# Patient Record
Sex: FEMALE | Race: WHITE | NOT HISPANIC OR LATINO | Employment: PART TIME | URBAN - METROPOLITAN AREA
[De-identification: names, ages, dates, MRNs, and addresses within clinical notes are randomized per-mention and may not be internally consistent; named-entity substitution may affect disease eponyms.]

---

## 2018-04-10 ENCOUNTER — TELEPHONE (OUTPATIENT)
Dept: HEMATOLOGY ONCOLOGY | Facility: MEDICAL CENTER | Age: 18
End: 2018-04-10

## 2018-04-11 NOTE — TELEPHONE ENCOUNTER
1st attempt to contact the patient.  LM on voicemail for patient requesting a call back to schedule a new patient hematology appointment.  Ref: Dom Lawrence Dx:  Decreased WBC

## 2018-05-09 ENCOUNTER — HOSPITAL ENCOUNTER (OUTPATIENT)
Facility: MEDICAL CENTER | Age: 18
End: 2018-05-09
Attending: INTERNAL MEDICINE
Payer: COMMERCIAL

## 2018-05-09 ENCOUNTER — OFFICE VISIT (OUTPATIENT)
Dept: HEMATOLOGY ONCOLOGY | Facility: MEDICAL CENTER | Age: 18
End: 2018-05-09
Payer: COMMERCIAL

## 2018-05-09 VITALS
DIASTOLIC BLOOD PRESSURE: 58 MMHG | HEART RATE: 60 BPM | WEIGHT: 144.95 LBS | SYSTOLIC BLOOD PRESSURE: 106 MMHG | RESPIRATION RATE: 14 BRPM | TEMPERATURE: 98.8 F | OXYGEN SATURATION: 98 % | BODY MASS INDEX: 24.75 KG/M2 | HEIGHT: 64 IN

## 2018-05-09 DIAGNOSIS — D70.8 OTHER NEUTROPENIA (HCC): ICD-10-CM

## 2018-05-09 PROBLEM — D70.9 NEUTROPENIA (HCC): Status: ACTIVE | Noted: 2018-05-09

## 2018-05-09 LAB
BASOPHILS # BLD AUTO: 0.5 % (ref 0–1.8)
BASOPHILS # BLD: 0.02 K/UL (ref 0–0.12)
EOSINOPHIL # BLD AUTO: 0.05 K/UL (ref 0–0.51)
EOSINOPHIL NFR BLD: 1.3 % (ref 0–6.9)
ERYTHROCYTE [DISTWIDTH] IN BLOOD BY AUTOMATED COUNT: 40.4 FL (ref 35.9–50)
ERYTHROCYTE [SEDIMENTATION RATE] IN BLOOD BY WESTERGREN METHOD: 8 MM/HOUR (ref 0–20)
FERRITIN SERPL-MCNC: 22.6 NG/ML (ref 10–291)
HCT VFR BLD AUTO: 40.9 % (ref 37–47)
HGB BLD-MCNC: 13.6 G/DL (ref 12–16)
IMM GRANULOCYTES # BLD AUTO: 0 K/UL (ref 0–0.11)
IMM GRANULOCYTES NFR BLD AUTO: 0 % (ref 0–0.9)
LYMPHOCYTES # BLD AUTO: 0.77 K/UL (ref 1–4.8)
LYMPHOCYTES NFR BLD: 20.2 % (ref 22–41)
MCH RBC QN AUTO: 28.8 PG (ref 27–33)
MCHC RBC AUTO-ENTMCNC: 33.3 G/DL (ref 33.6–35)
MCV RBC AUTO: 86.5 FL (ref 81.4–97.8)
MONOCYTES # BLD AUTO: 0.47 K/UL (ref 0–0.85)
MONOCYTES NFR BLD AUTO: 12.3 % (ref 0–13.4)
NEUTROPHILS # BLD AUTO: 2.5 K/UL (ref 2–7.15)
NEUTROPHILS NFR BLD: 65.7 % (ref 44–72)
NRBC # BLD AUTO: 0 K/UL
NRBC BLD-RTO: 0 /100 WBC
PLATELET # BLD AUTO: 213 K/UL (ref 164–446)
PMV BLD AUTO: 11 FL (ref 9–12.9)
RBC # BLD AUTO: 4.73 M/UL (ref 4.2–5.4)
TSH SERPL DL<=0.005 MIU/L-ACNC: 1.4 UIU/ML (ref 0.38–5.33)
VIT B12 SERPL-MCNC: 549 PG/ML (ref 211–911)
WBC # BLD AUTO: 3.8 K/UL (ref 4.8–10.8)

## 2018-05-09 PROCEDURE — 86225 DNA ANTIBODY NATIVE: CPT

## 2018-05-09 PROCEDURE — 85025 COMPLETE CBC W/AUTO DIFF WBC: CPT

## 2018-05-09 PROCEDURE — 86235 NUCLEAR ANTIGEN ANTIBODY: CPT | Mod: 91

## 2018-05-09 PROCEDURE — 82607 VITAMIN B-12: CPT

## 2018-05-09 PROCEDURE — 82728 ASSAY OF FERRITIN: CPT

## 2018-05-09 PROCEDURE — 85652 RBC SED RATE AUTOMATED: CPT

## 2018-05-09 PROCEDURE — 36415 COLL VENOUS BLD VENIPUNCTURE: CPT

## 2018-05-09 PROCEDURE — 99204 OFFICE O/P NEW MOD 45 MIN: CPT | Performed by: INTERNAL MEDICINE

## 2018-05-09 PROCEDURE — 84443 ASSAY THYROID STIM HORMONE: CPT

## 2018-05-09 PROCEDURE — 86038 ANTINUCLEAR ANTIBODIES: CPT

## 2018-05-09 ASSESSMENT — PAIN SCALES - GENERAL: PAINLEVEL: NO PAIN

## 2018-05-09 NOTE — PROGRESS NOTES
Consult Note: Hematology    Date of consultation: 5/9/2018 3:28 PM    Referring provider: Dom Lawrence D.O.    Reason for consultation: Mild leukopenia      History of presenting illness:     Dear  Dom Lawrence D.O.,    Thank you very much for allowing me to see  Rebecca Mendieta today . As you know she  is a 17 y.o. year old female who had routine labs done during evaluation of palpitations. This showed mild leukopenia with WBC of 3.2. Hemoglobin was normal at 13.2, platelet count 200. Differential was grossly unremarkable. ANC was around 1500. She denies having any acute complaints at this time. She is undergoing evaluation of palpitation. No history of lupus or other autoimmune problems. No lymphadenopathy. B symptoms or recurrent infections    Past Medical History:    Past Medical History:   Diagnosis Date   • Neutropenia (HCC) 5/9/2018       Past surgical history:  No past surgical history on file.    Allergies:  Patient has no known allergies.    Medications:    No current outpatient prescriptions on file.     No current facility-administered medications for this visit.        Social History:     Social History     Social History   • Marital status: Single     Spouse name: N/A   • Number of children: N/A   • Years of education: N/A     Occupational History   • Not on file.     Social History Main Topics   • Smoking status: Not on file   • Smokeless tobacco: Not on file   • Alcohol use Not on file   • Drug use: Unknown   • Sexual activity: Not on file     Other Topics Concern   • Not on file     Social History Narrative   • No narrative on file       Family History:   No family history on file.    Review of Systems:  All other review of systems are negative except what was mentioned above in the HPI.    Constitutional: No fever, chills, weight loss ,malaise/fatigue.    HEENT: No new auditory or visual complaints. No sore throat and neck pain.     Respiratory:No new cough, sputum production, shortness of  "breath and wheezing.    Cardiovascular: No new chest pain, positive for subjective palpitations, orthopnea and leg swelling.    Gastrointestinal: No heartburn, nausea, vomiting ,abdominal pain, hematochezia or melena     Genitourinary: Negative for dysuria, hematuria    Musculoskeletal: No new arthralgias or myalgias   Skin: Negative for rash and itching.    Neurological: Negative for focal weakness or headaches.    Endo/Heme/Allergies: No abnormal bleed/bruise.    Psychiatric/Behavioral: No new depression/anxiety.    Physical Exam:  Vitals:   /58   Pulse 60   Temp 37.1 °C (98.8 °F)   Resp 14   Ht 1.626 m (5' 4\")   Wt 65.8 kg (144 lb 15.2 oz)   SpO2 98%   Breastfeeding? No   BMI 24.88 kg/m²     General: Not in acute distress, alert and oriented x 3  HEENT: No pallor, icterus. Oropharynx clear.   Neck: Supple, no palpable masses.  Lymph nodes: No palpable cervical, supraclavicular, axillary or inguinal lymphadenopathy.    CVS: regular rate and rhythm, no rubs or gallops  RESP: Clear to auscultate bilaterally, no wheezing or crackles.   ABD: Soft, non tender, non distended, positive bowel sounds, no palpable organomegaly  EXT: No edema or cyanosis  CNS: Alert and oriented x3, No focal deficits.  Skin- No rash      Labs:   No results for input(s): RBC, HEMOGLOBIN, HEMATOCRIT, PLATELETCT, PROTHROMBTM, APTT, INR, IRON, FERRITIN, TOTIRONBC in the last 72 hours.  No results found for: SODIUM, POTASSIUM, CHLORIDE, CO2, GLUCOSE, BUN, CREATININE, BUNCREATRAT, GLOMRATE     Assessment and Plan:  , Mild leukopenia-repeat CBC from today shows WBC of 3.8. Differential is grossly unremarkable. She does not have any recurrent infections. B symptoms or lymphadenopathy. Reassured her that this could be a normal variant for her.    Rest of the laboratory workup including ESR, vitamin B12, ferritin, TSH was unremarkable. GIANCARLO titer later came back showing low positive titer of 1:80. ds DNA testing was negative. This is of " unclear significance and probably not clinically positive.    At this point she does not need any active intervention. CBC can be checked every 6 months to annually to keep an eye on her blood counts. If her WBC drops more towards 2000, It will like to see her back for more evaluation. If she develops any autoimmune manifestation, she will need further workup including repeat GIANCARLO titers.    She agreed and verbalized her agreement and understanding with the current plan.  I answered all questions and concerns she has at this time.              Thank you for allowing me to participate in her care.    Please note that this dictation was created using voice recognition software. I have made every reasonable attempt to correct obvious errors, but I expect that there are errors of grammar and possibly content that I did not discover before finalizing the note.      SIGNATURES:  Jaya Sapp    CC:  Pcp Pt States None  Dom Lawrence D.O.

## 2018-05-09 NOTE — LETTER
[facility]   [address] [place] [address], [address]  [place] [address]  Phone: [phone]  Fax: [phone]              Rebecca Mendieta  2000    Encounter Date: 5/9/2018    Jaya Sapp M.D.          PROGRESS NOTE:  Consult Note: Hematology    Date of consultation: 5/9/2018 3:28 PM    Referring provider: Dom Lawrence D.O.    Reason for consultation: Mild leukopenia      History of presenting illness:     Dear  Dom Lawrence D.O.,    Thank you very much for allowing me to see  Rebecca Mendieta today . As you know she  is a 17 y.o. year old female who had routine labs done during evaluation of palpitations. This showed mild leukopenia with WBC of 3.2. Hemoglobin was normal at 13.2, platelet count 200. Differential was grossly unremarkable. ANC was around 1500. She denies having any acute complaints at this time. She is undergoing evaluation of palpitation. No history of lupus or other autoimmune problems. No lymphadenopathy. B symptoms or recurrent infections    Past Medical History:    Past Medical History:   Diagnosis Date   • Neutropenia (HCC) 5/9/2018       Past surgical history:  No past surgical history on file.    Allergies:  Patient has no known allergies.    Medications:    No current outpatient prescriptions on file.     No current facility-administered medications for this visit.        Social History:     Social History     Social History   • Marital status: Single     Spouse name: N/A   • Number of children: N/A   • Years of education: N/A     Occupational History   • Not on file.     Social History Main Topics   • Smoking status: Not on file   • Smokeless tobacco: Not on file   • Alcohol use Not on file   • Drug use: Unknown   • Sexual activity: Not on file     Other Topics Concern   • Not on file     Social History Narrative   • No narrative on file       Family History:   No family history on file.    Review of Systems:  All other review of systems are negative except what was  "mentioned above in the HPI.    Constitutional: No fever, chills, weight loss ,malaise/fatigue.    HEENT: No new auditory or visual complaints. No sore throat and neck pain.     Respiratory:No new cough, sputum production, shortness of breath and wheezing.    Cardiovascular: No new chest pain, palpitations, orthopnea and leg swelling.    Gastrointestinal: No heartburn, nausea, vomiting ,abdominal pain, hematochezia or melena     Genitourinary: Negative for dysuria, hematuria    Musculoskeletal: No new arthralgias or myalgias   Skin: Negative for rash and itching.    Neurological: Negative for focal weakness or headaches.    Endo/Heme/Allergies: No abnormal bleed/bruise.    Psychiatric/Behavioral: No new depression/anxiety.    Physical Exam:  Vitals:   /58   Pulse 60   Temp 37.1 °C (98.8 °F)   Resp 14   Ht 1.626 m (5' 4\")   Wt 65.8 kg (144 lb 15.2 oz)   SpO2 98%   Breastfeeding? No   BMI 24.88 kg/m²      General: Not in acute distress, alert and oriented x 3  HEENT: No pallor, icterus. Oropharynx clear.   Neck: Supple, no palpable masses.  Lymph nodes: No palpable cervical, supraclavicular, axillary or inguinal lymphadenopathy.    CVS: regular rate and rhythm, no rubs or gallops  RESP: Clear to auscultate bilaterally, no wheezing or crackles.   ABD: Soft, non tender, non distended, positive bowel sounds, no palpable organomegaly  EXT: No edema or cyanosis  CNS: Alert and oriented x3, No focal deficits.  Skin- No rash      Labs:   No results for input(s): RBC, HEMOGLOBIN, HEMATOCRIT, PLATELETCT, PROTHROMBTM, APTT, INR, IRON, FERRITIN, TOTIRONBC in the last 72 hours.  No results found for: SODIUM, POTASSIUM, CHLORIDE, CO2, GLUCOSE, BUN, CREATININE, BUNCREATRAT, GLOMRATE     Assessment and Plan:  , Mild leukopenia-repeat CBC from today shows WBC of 3.8. Differential is grossly unremarkable. She does not have any recurrent infections. B symptoms or lymphadenopathy. Reassured her that this could be a normal " gradient for her.    Rest of the laboratory workup including ESR, vitamin B12, ferritin, TSH was unremarkable. GIANCARLO titer later came back showing low positive titer of 1:80. DS DNA testing was negative. This is of unclear significance and probably not clinically positive.    At this point she does not need any active intervention. CBC can be checked every 6 months to annually to keep an eye on her blood counts. If her WBC drops more towards 2000. It will like to see her back for more evaluation. If she develops any autoimmune manifestation, she will need further workup including repeat GIANCARLO titers.    She agreed and verbalized her agreement and understanding with the current plan.  I answered all questions and concerns she has at this time.              Thank you for allowing me to participate in her care.    Please note that this dictation was created using voice recognition software. I have made every reasonable attempt to correct obvious errors, but I expect that there are errors of grammar and possibly content that I did not discover before finalizing the note.      SIGNATURES:  Jaya Sapp    CC:  Pcp Pt States None  Dom Lawrence, D.O.        No Recipients

## 2018-05-14 ENCOUNTER — TELEPHONE (OUTPATIENT)
Dept: HEMATOLOGY ONCOLOGY | Facility: MEDICAL CENTER | Age: 18
End: 2018-05-14

## 2018-05-15 NOTE — TELEPHONE ENCOUNTER
Attempted to reach Rebecca to review lab results (GIANCARLO titer) per Dr. Sapp. Pt's GIANCARLO was mildly positive, but Dr. Sapp does not believe this to be clinically significant. No f/u needed.    LVM to call TALYA Whiting at [phone].

## 2018-05-16 LAB
DSDNA AB TITR SER CLIF: ABNORMAL {TITER}
ENA SM IGG SER-ACNC: 0 AU/ML (ref 0–40)
ENA SS-B IGG SER IA-ACNC: 0 AU/ML (ref 0–40)
NUCLEAR IGG SER QL IA: DETECTED
NUCLEAR IGG TITR SER IF: ABNORMAL {TITER}
SSA52 R0ENA AB IGG Q0420: 8 AU/ML (ref 0–40)
SSA60 R0ENA AB IGG Q0419: 11 AU/ML (ref 0–40)
U1 SNRNP IGG SER QL: 0 AU/ML (ref 0–40)

## 2022-04-13 NOTE — LETTER
[facility]   [address] [place] [address], [address]  [place] [address]  Phone: [phone]  Fax: [phone]              Rebecca Mendieta  2000    Encounter Date: 5/9/2018    Jaya Sapp M.D.          PROGRESS NOTE:  Consult Note: Hematology    Date of consultation: 5/9/2018 3:28 PM    Referring provider: Dom Lawrence D.O.    Reason for consultation: Mild leukopenia      History of presenting illness:     Dear  Dom Lawrence D.O.,    Thank you very much for allowing me to see  Rebecca Mendieta today . As you know she  is a 17 y.o. year old female who had routine labs done during evaluation of palpitations. This showed mild leukopenia with WBC of 3.2. Hemoglobin was normal at 13.2, platelet count 200. Differential was grossly unremarkable. ANC was around 1500. She denies having any acute complaints at this time. She is undergoing evaluation of palpitation. No history of lupus or other autoimmune problems. No lymphadenopathy. B symptoms or recurrent infections    Past Medical History:    Past Medical History:   Diagnosis Date   • Neutropenia (HCC) 5/9/2018       Past surgical history:  No past surgical history on file.    Allergies:  Patient has no known allergies.    Medications:    No current outpatient prescriptions on file.     No current facility-administered medications for this visit.        Social History:     Social History     Social History   • Marital status: Single     Spouse name: N/A   • Number of children: N/A   • Years of education: N/A     Occupational History   • Not on file.     Social History Main Topics   • Smoking status: Not on file   • Smokeless tobacco: Not on file   • Alcohol use Not on file   • Drug use: Unknown   • Sexual activity: Not on file     Other Topics Concern   • Not on file     Social History Narrative   • No narrative on file       Family History:   No family history on file.    Review of Systems:  All other review of systems are negative except what was  "mentioned above in the HPI.    Constitutional: No fever, chills, weight loss ,malaise/fatigue.    HEENT: No new auditory or visual complaints. No sore throat and neck pain.     Respiratory:No new cough, sputum production, shortness of breath and wheezing.    Cardiovascular: No new chest pain, positive for subjective palpitations, orthopnea and leg swelling.    Gastrointestinal: No heartburn, nausea, vomiting ,abdominal pain, hematochezia or melena     Genitourinary: Negative for dysuria, hematuria    Musculoskeletal: No new arthralgias or myalgias   Skin: Negative for rash and itching.    Neurological: Negative for focal weakness or headaches.    Endo/Heme/Allergies: No abnormal bleed/bruise.    Psychiatric/Behavioral: No new depression/anxiety.    Physical Exam:  Vitals:   /58   Pulse 60   Temp 37.1 °C (98.8 °F)   Resp 14   Ht 1.626 m (5' 4\")   Wt 65.8 kg (144 lb 15.2 oz)   SpO2 98%   Breastfeeding? No   BMI 24.88 kg/m²      General: Not in acute distress, alert and oriented x 3  HEENT: No pallor, icterus. Oropharynx clear.   Neck: Supple, no palpable masses.  Lymph nodes: No palpable cervical, supraclavicular, axillary or inguinal lymphadenopathy.    CVS: regular rate and rhythm, no rubs or gallops  RESP: Clear to auscultate bilaterally, no wheezing or crackles.   ABD: Soft, non tender, non distended, positive bowel sounds, no palpable organomegaly  EXT: No edema or cyanosis  CNS: Alert and oriented x3, No focal deficits.  Skin- No rash      Labs:   No results for input(s): RBC, HEMOGLOBIN, HEMATOCRIT, PLATELETCT, PROTHROMBTM, APTT, INR, IRON, FERRITIN, TOTIRONBC in the last 72 hours.  No results found for: SODIUM, POTASSIUM, CHLORIDE, CO2, GLUCOSE, BUN, CREATININE, BUNCREATRAT, GLOMRATE     Assessment and Plan:  , Mild leukopenia-repeat CBC from today shows WBC of 3.8. Differential is grossly unremarkable. She does not have any recurrent infections. B symptoms or lymphadenopathy. Reassured her that " this could be a normal variant for her.    Rest of the laboratory workup including ESR, vitamin B12, ferritin, TSH was unremarkable. GIANCARLO titer later came back showing low positive titer of 1:80. ds DNA testing was negative. This is of unclear significance and probably not clinically positive.    At this point she does not need any active intervention. CBC can be checked every 6 months to annually to keep an eye on her blood counts. If her WBC drops more towards 2000, It will like to see her back for more evaluation. If she develops any autoimmune manifestation, she will need further workup including repeat GIANCARLO titers.    She agreed and verbalized her agreement and understanding with the current plan.  I answered all questions and concerns she has at this time.              Thank you for allowing me to participate in her care.    Please note that this dictation was created using voice recognition software. I have made every reasonable attempt to correct obvious errors, but I expect that there are errors of grammar and possibly content that I did not discover before finalizing the note.      SIGNATURES:  Jaya Sapp    CC:  Pcp Pt States None  Dom Lawrence, D.O.        No Recipients               Speaking Coherently

## 2023-02-11 ENCOUNTER — OFFICE VISIT (OUTPATIENT)
Dept: URGENT CARE | Facility: CLINIC | Age: 23
End: 2023-02-11
Payer: COMMERCIAL

## 2023-02-11 VITALS
DIASTOLIC BLOOD PRESSURE: 60 MMHG | OXYGEN SATURATION: 100 % | HEIGHT: 65 IN | RESPIRATION RATE: 14 BRPM | SYSTOLIC BLOOD PRESSURE: 110 MMHG | WEIGHT: 155 LBS | TEMPERATURE: 98.4 F | BODY MASS INDEX: 25.83 KG/M2 | HEART RATE: 92 BPM

## 2023-02-11 DIAGNOSIS — S01.332A INFECTED PIERCED EAR, LEFT, INITIAL ENCOUNTER: ICD-10-CM

## 2023-02-11 DIAGNOSIS — L08.9 INFECTED PIERCED EAR, LEFT, INITIAL ENCOUNTER: ICD-10-CM

## 2023-02-11 PROCEDURE — 99203 OFFICE O/P NEW LOW 30 MIN: CPT | Performed by: FAMILY MEDICINE

## 2023-02-11 RX ORDER — NORGESTIMATE AND ETHINYL ESTRADIOL 7DAYSX3 28
1 KIT ORAL
COMMUNITY
Start: 2022-11-26 | End: 2023-08-22

## 2023-02-11 RX ORDER — CIPROFLOXACIN 500 MG/1
500 TABLET, FILM COATED ORAL 2 TIMES DAILY
Qty: 14 TABLET | Refills: 0 | Status: SHIPPED | OUTPATIENT
Start: 2023-02-11 | End: 2023-02-18

## 2023-02-11 ASSESSMENT — ENCOUNTER SYMPTOMS: FEVER: 0

## 2023-02-11 NOTE — PROGRESS NOTES
"Subjective:     Rebecca Mendieta is a 22 y.o. female who presents for Ear Pain (X3 days new piercing swollen)    HPI  Pt presents for evaluation of an acute problem  Patient with a new piercing in the left ear about 10 days ago  Has had multiple piercings in the past and has had no bad reactions to different types of metals or jewelry  Has been cleaning the piercing as she usually does with other past piercings  Ear has started to become red and swollen the past 3 days  Swelling is focally around the area of the new piercing  Becoming more painful and wanted to be evaluated to see if this is normal or if it is an infection    Review of Systems   Constitutional:  Negative for fever.   Skin:  Positive for rash.     PMH:  has a past medical history of Neutropenia (HCC) (5/9/2018).  MEDS:   Current Outpatient Medications:     TRI-SPRINTEC 0.18/0.215/0.25 MG-35 MCG Tab, Take 1 Tablet by mouth every day., Disp: , Rfl:     ciprofloxacin (CIPRO) 500 MG Tab, Take 1 Tablet by mouth 2 times a day for 7 days., Disp: 14 Tablet, Rfl: 0  ALLERGIES: No Known Allergies  SURGHX: History reviewed. No pertinent surgical history.  SOCHX:  reports that she has never smoked. She has never used smokeless tobacco.     Objective:   /60 (BP Location: Left arm, Patient Position: Sitting, BP Cuff Size: Adult)   Pulse 92   Temp 36.9 °C (98.4 °F) (Temporal)   Resp 14   Ht 1.638 m (5' 4.5\")   Wt 70.3 kg (155 lb)   SpO2 100%   BMI 26.19 kg/m²     Physical Exam  Constitutional:       General: She is not in acute distress.     Appearance: She is well-developed. She is not diaphoretic.   Pulmonary:      Effort: Pulmonary effort is normal.   Skin:     Comments: Left ear with piercing in the upper outer ear with mild surrounding erythema and swelling, no sign of abscess formation, no active drainage/bleeding, no fluctuance.  Area is very tender   Neurological:      Mental Status: She is alert.       Assessment/Plan:   Assessment    1. " Infected pierced ear, left, initial encounter  - ciprofloxacin (CIPRO) 500 MG Tab; Take 1 Tablet by mouth 2 times a day for 7 days.  Dispense: 14 Tablet; Refill: 0    Other orders  - TRI-SPRINTEC 0.18/0.215/0.25 MG-35 MCG Tab; Take 1 Tablet by mouth every day.    Patient with infected ear piercing.  Start Cipro and review close follow-up precautions.  Did review hygiene measures and how to clean the area.  Follow-up in the urgent care if not rapidly improving.

## 2023-08-15 ENCOUNTER — HOSPITAL ENCOUNTER (OUTPATIENT)
Facility: MEDICAL CENTER | Age: 23
End: 2023-08-15
Payer: COMMERCIAL

## 2023-08-15 PROCEDURE — 87591 N.GONORRHOEAE DNA AMP PROB: CPT

## 2023-08-15 PROCEDURE — 87491 CHLMYD TRACH DNA AMP PROBE: CPT

## 2023-08-15 PROCEDURE — 88175 CYTOPATH C/V AUTO FLUID REDO: CPT

## 2023-08-19 LAB
C TRACH RRNA CVX QL NAA+PROBE: NEGATIVE
COMMENT NL11729A: NORMAL
CYTOLOGIST CVX/VAG CYTO: NORMAL
CYTOLOGY CVX/VAG DOC CYTO: NORMAL
CYTOLOGY CVX/VAG DOC THIN PREP: NORMAL
N GONORRHOEA RRNA CVX QL NAA+PROBE: NEGATIVE
NOTE NL11727A: NORMAL
OTHER STN SPEC: NORMAL
STAT OF ADQ CVX/VAG CYTO-IMP: NORMAL

## 2023-08-22 ENCOUNTER — OFFICE VISIT (OUTPATIENT)
Dept: URGENT CARE | Facility: PHYSICIAN GROUP | Age: 23
End: 2023-08-22
Payer: COMMERCIAL

## 2023-08-22 VITALS
SYSTOLIC BLOOD PRESSURE: 116 MMHG | RESPIRATION RATE: 16 BRPM | DIASTOLIC BLOOD PRESSURE: 70 MMHG | HEIGHT: 65 IN | BODY MASS INDEX: 23.39 KG/M2 | WEIGHT: 140.4 LBS | TEMPERATURE: 98.5 F | OXYGEN SATURATION: 99 % | HEART RATE: 75 BPM

## 2023-08-22 DIAGNOSIS — H57.89 IRRITATION OF LEFT EYE: ICD-10-CM

## 2023-08-22 PROCEDURE — 3078F DIAST BP <80 MM HG: CPT | Performed by: NURSE PRACTITIONER

## 2023-08-22 PROCEDURE — 99213 OFFICE O/P EST LOW 20 MIN: CPT | Performed by: NURSE PRACTITIONER

## 2023-08-22 PROCEDURE — 3074F SYST BP LT 130 MM HG: CPT | Performed by: NURSE PRACTITIONER

## 2023-08-22 RX ORDER — NORGESTIMATE AND ETHINYL ESTRADIOL 7DAYSX3 28
1 KIT ORAL DAILY
COMMUNITY
Start: 2023-06-14 | End: 2023-08-22

## 2023-08-22 RX ORDER — NORGESTIMATE AND ETHINYL ESTRADIOL 0.25-0.035
1 KIT ORAL DAILY
COMMUNITY

## 2023-08-22 ASSESSMENT — ENCOUNTER SYMPTOMS
BLURRED VISION: 0
DOUBLE VISION: 0
SORE THROAT: 0
MYALGIAS: 0
EYE DISCHARGE: 0
PHOTOPHOBIA: 0
WEAKNESS: 0
EYE REDNESS: 1
FEVER: 0
EYE PAIN: 0
HEADACHES: 0
SINUS PAIN: 0
COUGH: 0
DIZZINESS: 0
CHILLS: 0

## 2023-08-22 NOTE — PROGRESS NOTES
"Musa Mendieta is a 22 y.o. female who presents with Pink Eye (L eye pink, crusty, onset last night )            HPI   has been experiencing acute left eye redness and irritation with tearing since last night.   does not wear contact lens.  Last eye exam was November of last year and no changes in prescription of her glasses.  Denies itchy eyes.   does want to rub her eye but is trying to avoid this.  No thick eye discharge or known exposure to pinkeye.  Will experience intermittent blurry vision with tearing.  No over-the-counter medications used for this.    PMH:  has a past medical history of Neutropenia (HCC) (5/9/2018).  MEDS:   Current Outpatient Medications:     norgestimate-ethinyl estradiol (SPRINTEC 28) 0.25-35 MG-MCG per tablet, Take 1 Tablet by mouth every day., Disp: , Rfl:   ALLERGIES: No Known Allergies  SURGHX: History reviewed. No pertinent surgical history.  SOCHX:  reports that she has never smoked. She has never used smokeless tobacco. She reports current alcohol use.  FH: Family history was reviewed, no pertinent findings to report    Review of Systems   Constitutional:  Negative for chills, fever and malaise/fatigue.   HENT:  Negative for congestion, ear pain, sinus pain and sore throat.    Eyes:  Positive for redness. Negative for blurred vision, double vision, photophobia, pain and discharge.   Respiratory:  Negative for cough.    Musculoskeletal:  Negative for myalgias.   Skin:  Negative for itching and rash.   Neurological:  Negative for dizziness, weakness and headaches.   Endo/Heme/Allergies:  Negative for environmental allergies.   All other systems reviewed and are negative.             Objective     /70 (BP Location: Right arm, Patient Position: Sitting, BP Cuff Size: Adult)   Pulse 75   Temp 36.9 °C (98.5 °F) (Temporal)   Resp 16   Ht 1.638 m (5' 4.5\")   Wt 63.7 kg (140 lb 6.4 oz)   SpO2 99%   BMI 23.73 kg/m²      Physical Exam  Vitals " reviewed.   Constitutional:       General: She is awake. She is not in acute distress.     Appearance: Normal appearance. She is well-developed. She is not ill-appearing, toxic-appearing or diaphoretic.   HENT:      Nose: Nose normal.   Eyes:      General: Lids are normal.         Left eye: No foreign body, discharge or hordeolum.      Extraocular Movements: Extraocular movements intact.      Conjunctiva/sclera: Conjunctivae normal.      Left eye: Left conjunctiva is not injected. No chemosis, exudate or hemorrhage.     Pupils: Pupils are equal, round, and reactive to light.      Comments: Mild redness to sclera without conjunctival hemorrhage noted.  No visible foreign body in eye and conjunctive a is without increased erythema.  No discharge seen.     Cardiovascular:      Rate and Rhythm: Normal rate.   Pulmonary:      Effort: Pulmonary effort is normal.   Musculoskeletal:         General: Normal range of motion.   Skin:     General: Skin is warm and dry.   Neurological:      Mental Status: She is alert and oriented to person, place, and time.   Psychiatric:         Attention and Perception: Attention normal.         Mood and Affect: Mood normal.         Speech: Speech normal.         Behavior: Behavior normal. Behavior is cooperative.                             Assessment & Plan        1. Irritation of left eye  No indication for pinkeye at this time  -May use over the counter longer acting antihistamine (like Claritin/Zyrtec/Allegra without decongestant; chose one) x 7 days then as needed for any eye itching this  -May use cool compresses for any periorbital eye swelling  -Avoid touching/rubbing eyes  -May use saline eye rinse or eye lubricating solution as needed for eye dryness/irritation  -Monitor for increase in redness or swelling, vision change, eye pain, eye discharge, headache, dizziness- need re-evaluation in urgent care

## 2024-01-17 ENCOUNTER — HOSPITAL ENCOUNTER (OUTPATIENT)
Dept: LAB | Facility: MEDICAL CENTER | Age: 24
End: 2024-01-17
Payer: COMMERCIAL

## 2024-01-17 ENCOUNTER — HOSPITAL ENCOUNTER (OUTPATIENT)
Facility: MEDICAL CENTER | Age: 24
End: 2024-01-17
Payer: COMMERCIAL

## 2024-01-17 LAB
C TRACH DNA GENITAL QL NAA+PROBE: NEGATIVE
N GONORRHOEA DNA GENITAL QL NAA+PROBE: NEGATIVE
SPECIMEN SOURCE: NORMAL

## 2024-01-17 PROCEDURE — 87491 CHLMYD TRACH DNA AMP PROBE: CPT

## 2024-01-17 PROCEDURE — 87591 N.GONORRHOEAE DNA AMP PROB: CPT

## 2025-01-27 ENCOUNTER — HOSPITAL ENCOUNTER (OUTPATIENT)
Facility: MEDICAL CENTER | Age: 25
End: 2025-01-27
Payer: COMMERCIAL

## 2025-01-27 LAB
HCV AB SER QL: NORMAL
HIV 1+2 AB+HIV1 P24 AG SERPL QL IA: NORMAL
T PALLIDUM AB SER QL IA: NORMAL

## 2025-01-27 PROCEDURE — 87591 N.GONORRHOEAE DNA AMP PROB: CPT

## 2025-01-27 PROCEDURE — 86780 TREPONEMA PALLIDUM: CPT

## 2025-01-27 PROCEDURE — 86803 HEPATITIS C AB TEST: CPT

## 2025-01-27 PROCEDURE — 87491 CHLMYD TRACH DNA AMP PROBE: CPT

## 2025-01-27 PROCEDURE — 87389 HIV-1 AG W/HIV-1&-2 AB AG IA: CPT

## 2025-01-27 PROCEDURE — 36415 COLL VENOUS BLD VENIPUNCTURE: CPT

## 2025-01-27 PROCEDURE — 88142 CYTOPATH C/V THIN LAYER: CPT

## 2025-02-01 LAB — THINPREP PAP, CYTOLOGY NL11781: NORMAL
